# Patient Record
Sex: FEMALE | Race: WHITE
[De-identification: names, ages, dates, MRNs, and addresses within clinical notes are randomized per-mention and may not be internally consistent; named-entity substitution may affect disease eponyms.]

---

## 2019-01-09 ENCOUNTER — HOSPITAL ENCOUNTER (OUTPATIENT)
Dept: HOSPITAL 89 - MAMO | Age: 44
End: 2019-01-09
Attending: NURSE PRACTITIONER
Payer: COMMERCIAL

## 2019-01-09 DIAGNOSIS — Z12.31: Primary | ICD-10-CM

## 2019-01-09 PROCEDURE — 77067 SCR MAMMO BI INCL CAD: CPT

## 2019-01-09 PROCEDURE — 77063 BREAST TOMOSYNTHESIS BI: CPT

## 2019-01-10 NOTE — RADIOLOGY IMAGING REPORT
FACILITY: [facility] [place] 

 

PATIENT NAME: REYMUNDO CASEY

: [id number]

MR: [id number]

V: [id number]

EXAM DATE: [id number]

ORDERING PHYSICIAN: KLEVER RAMACHANDRAN

TECHNOLOGIST: Solange Cruz

 

PROCEDURE:BILATERAL DIGITAL SCREENING MAMMOGRAM WITH CAD ASSISTED 

INTERPRETATION & 3D TOMOSYNTHESIS 

 

COMPARISON:Prior mammograms 10/5/17, 13, 13.

 

INDICATIONS:SCREENING

 

FINDINGS:  

The breasts are heterogeneously dense which can obscure small masses. 

The parenchymal pattern has remained stable allowing for difference in 

mammographic technique & patient positioning.

 

DIAGNOSTIC CATEGORY 1--NEGATIVE.  

 

RECOMMENDATIONS:

ROUTINE MAMMOGRAM AND CLINICAL EVALUATION.   

 

IMPRESSION:

BIRADS 1: Negative. 

No significant abnormality is seen.

 

 

 

 

 

 

 

Dictated by:  Senait Marte M.D. on 2019 at 16:38   

Transcribed by: FIX on 1/10/2019 at 8:09    

Approved by:  Senait Marte M.D. on 1/10/2019 at 17:12   

[facility]

## 2019-02-08 ENCOUNTER — HOSPITAL ENCOUNTER (OUTPATIENT)
Dept: HOSPITAL 89 - OR | Age: 44
End: 2019-02-08
Attending: SURGERY
Payer: COMMERCIAL

## 2019-02-08 VITALS — DIASTOLIC BLOOD PRESSURE: 52 MMHG | SYSTOLIC BLOOD PRESSURE: 81 MMHG

## 2019-02-08 VITALS — HEIGHT: 68 IN | WEIGHT: 123 LBS | BODY MASS INDEX: 18.64 KG/M2

## 2019-02-08 VITALS — DIASTOLIC BLOOD PRESSURE: 53 MMHG | SYSTOLIC BLOOD PRESSURE: 84 MMHG

## 2019-02-08 VITALS — SYSTOLIC BLOOD PRESSURE: 82 MMHG | DIASTOLIC BLOOD PRESSURE: 48 MMHG

## 2019-02-08 VITALS — DIASTOLIC BLOOD PRESSURE: 47 MMHG | SYSTOLIC BLOOD PRESSURE: 80 MMHG

## 2019-02-08 VITALS — DIASTOLIC BLOOD PRESSURE: 58 MMHG | SYSTOLIC BLOOD PRESSURE: 86 MMHG

## 2019-02-08 VITALS — DIASTOLIC BLOOD PRESSURE: 60 MMHG | SYSTOLIC BLOOD PRESSURE: 88 MMHG

## 2019-02-08 VITALS — DIASTOLIC BLOOD PRESSURE: 42 MMHG | SYSTOLIC BLOOD PRESSURE: 76 MMHG

## 2019-02-08 DIAGNOSIS — Z80.0: ICD-10-CM

## 2019-02-08 DIAGNOSIS — D12.5: ICD-10-CM

## 2019-02-08 DIAGNOSIS — Z12.11: Primary | ICD-10-CM

## 2019-02-08 DIAGNOSIS — D12.4: ICD-10-CM

## 2019-02-08 PROCEDURE — 00812 ANES LWR INTST SCR COLSC: CPT

## 2019-02-08 PROCEDURE — 88305 TISSUE EXAM BY PATHOLOGIST: CPT

## 2019-02-08 PROCEDURE — 45385 COLONOSCOPY W/LESION REMOVAL: CPT

## 2019-02-08 NOTE — SHORT(OUTPT) DISCHARGE SUMMARY
Discharge Summary


Reason for Hosp/Final Diag:  


(1) Encounter for screening colonoscopy


Status:  Acute


Hospital Course & Plan:  42 yo f presents for screening colonoscopy. she to


lerated the procedure well and there were no complications. path pending. she 


will be discharged home when criteria met. 





Departure


Discharge to:  Home





Discharge Instructions


Home Meds


Reported Medications


Ginkgo Biloba (GINKGO) 60 Mg Tablet, 60 MG PO QDAY


   1/28/19


Cyanocobalamin (Vitamin B-12) (Vitamin B-12) Unknown Strength Capsule, PO QDAY


   12/26/18


Lactobacillus Combination No.4 (PROBIOTIC) 1 Each Capsule, 1 EACH PO QDAY, 


CAPSULE


   12/26/18


Omega-3 Fatty Acids (FISH OIL) 500 Mg Capsule, 500 MG PO DAILY, CAPSULE


   10/28/14


Cholecalciferol (Vitamin D3) (VITAMIN D) 400 Unit Capsule, 400 UNIT PO DAILY, 


CAPSULE


   10/28/14


Diet:  Regular


Activity:  As Tolerated


Special Instructions:  


we will call you results in 10 days.











CARLOS TORRES                   Feb 8, 2019 09:59